# Patient Record
Sex: MALE
[De-identification: names, ages, dates, MRNs, and addresses within clinical notes are randomized per-mention and may not be internally consistent; named-entity substitution may affect disease eponyms.]

---

## 2017-04-17 ENCOUNTER — IMAGING SERVICES (OUTPATIENT)
Dept: OTHER | Age: 32
End: 2017-04-17

## 2017-04-17 ENCOUNTER — CHARTING TRANS (OUTPATIENT)
Dept: OCCUPATIONAL MEDICINE | Age: 32
End: 2017-04-17

## 2017-04-24 ENCOUNTER — CHARTING TRANS (OUTPATIENT)
Dept: OCCUPATIONAL MEDICINE | Age: 32
End: 2017-04-24

## 2018-11-09 ENCOUNTER — CHARTING TRANS (OUTPATIENT)
Dept: OTHER | Age: 33
End: 2018-11-09

## 2018-11-09 ASSESSMENT — PAIN SCALES - GENERAL: PAINLEVEL_OUTOF10: 9

## 2018-11-10 ENCOUNTER — CHARTING TRANS (OUTPATIENT)
Dept: OTHER | Age: 33
End: 2018-11-10

## 2018-11-10 ASSESSMENT — PAIN SCALES - GENERAL: PAINLEVEL_OUTOF10: 7

## 2018-11-26 ENCOUNTER — CHARTING TRANS (OUTPATIENT)
Dept: OTHER | Age: 33
End: 2018-11-26

## 2018-11-26 ENCOUNTER — IMAGING SERVICES (OUTPATIENT)
Dept: OTHER | Age: 33
End: 2018-11-26

## 2018-11-28 VITALS
HEIGHT: 71 IN | BODY MASS INDEX: 35 KG/M2 | SYSTOLIC BLOOD PRESSURE: 150 MMHG | HEART RATE: 49 BPM | DIASTOLIC BLOOD PRESSURE: 121 MMHG | RESPIRATION RATE: 16 BRPM | TEMPERATURE: 99 F | WEIGHT: 250 LBS

## 2018-11-28 VITALS — DIASTOLIC BLOOD PRESSURE: 84 MMHG | TEMPERATURE: 99.3 F | SYSTOLIC BLOOD PRESSURE: 135 MMHG | HEART RATE: 92 BPM

## 2018-12-03 ENCOUNTER — WORKER'S COMP (OUTPATIENT)
Dept: OCCUPATIONAL MEDICINE | Age: 33
End: 2018-12-03

## 2018-12-03 DIAGNOSIS — S60.051D CONTUSION OF RIGHT LITTLE FINGER WITHOUT DAMAGE TO NAIL, SUBSEQUENT ENCOUNTER: Primary | ICD-10-CM

## 2018-12-03 PROCEDURE — 99212 OFFICE O/P EST SF 10 MIN: CPT | Performed by: PHYSICIAN ASSISTANT

## 2018-12-03 ASSESSMENT — PAIN SCALES - GENERAL: PAINLEVEL: 5-6

## 2018-12-04 VITALS
HEART RATE: 125 BPM | DIASTOLIC BLOOD PRESSURE: 93 MMHG | TEMPERATURE: 100.3 F | RESPIRATION RATE: 16 BRPM | SYSTOLIC BLOOD PRESSURE: 147 MMHG

## 2019-03-05 VITALS
HEART RATE: 107 BPM | DIASTOLIC BLOOD PRESSURE: 86 MMHG | TEMPERATURE: 98.6 F | RESPIRATION RATE: 16 BRPM | SYSTOLIC BLOOD PRESSURE: 140 MMHG | OXYGEN SATURATION: 99 %

## 2019-03-05 VITALS
HEART RATE: 98 BPM | TEMPERATURE: 99.1 F | OXYGEN SATURATION: 99 % | SYSTOLIC BLOOD PRESSURE: 142 MMHG | RESPIRATION RATE: 16 BRPM | DIASTOLIC BLOOD PRESSURE: 90 MMHG

## 2019-07-10 ENCOUNTER — OFFICE VISIT (OUTPATIENT)
Dept: FAMILY MEDICINE CLINIC | Facility: CLINIC | Age: 34
End: 2019-07-10
Payer: COMMERCIAL

## 2019-07-10 VITALS
HEIGHT: 70.75 IN | DIASTOLIC BLOOD PRESSURE: 80 MMHG | WEIGHT: 262.38 LBS | RESPIRATION RATE: 16 BRPM | OXYGEN SATURATION: 98 % | HEART RATE: 80 BPM | TEMPERATURE: 98 F | SYSTOLIC BLOOD PRESSURE: 130 MMHG | BODY MASS INDEX: 36.73 KG/M2

## 2019-07-10 DIAGNOSIS — L73.9 FOLLICULITIS: ICD-10-CM

## 2019-07-10 DIAGNOSIS — L02.811 CUTANEOUS ABSCESS OF HEAD EXCLUDING FACE: Primary | ICD-10-CM

## 2019-07-10 PROCEDURE — 99203 OFFICE O/P NEW LOW 30 MIN: CPT | Performed by: NURSE PRACTITIONER

## 2019-07-10 RX ORDER — DOXYCYCLINE HYCLATE 100 MG
100 TABLET ORAL DAILY
Qty: 90 TABLET | Refills: 1 | Status: SHIPPED | OUTPATIENT
Start: 2019-07-10 | End: 2019-10-08

## 2019-07-10 RX ORDER — DOXYCYCLINE HYCLATE 100 MG
100 TABLET ORAL 2 TIMES DAILY
Qty: 20 TABLET | Refills: 0 | Status: SHIPPED | OUTPATIENT
Start: 2019-07-10 | End: 2019-07-20

## 2019-07-10 NOTE — PATIENT INSTRUCTIONS
Folliculitis  Folliculitis is an inflammation of a hair follicle. A hair follicle is the little pocket where a hair grows out of the skin. Bacteria normally live on the skin. But sometimes bacteria can get trapped in a follicle and cause infection.  This · Change sheets and blankets if they are soiled by pus. Wash all clothes, towels, sheets, and cloth diapers in soap and hot water. Do not share clothes, towels, or sheets with other family members. · Do not soak the sores in bath water.  This can spread in

## 2019-07-10 NOTE — PROGRESS NOTES
HPI:   Mass   This is a new (on the back of his neck) problem. Episode onset: 1 week ago. The problem has been gradually improving.  Pertinent negatives include no abdominal pain, chest pain, chills, congestion, coughing, fatigue, fever, headaches, nausea Cardiovascular: Normal rate, regular rhythm and normal heart sounds. Pulmonary/Chest: Effort normal and breath sounds normal. He has no wheezes. Skin:        2 cm by 4 cm erythematous, non-fluctuanting tender lesion.  Several smaller papules on posteri

## 2021-05-25 VITALS
TEMPERATURE: 98.6 F | HEART RATE: 67 BPM | SYSTOLIC BLOOD PRESSURE: 141 MMHG | RESPIRATION RATE: 16 BRPM | DIASTOLIC BLOOD PRESSURE: 85 MMHG

## 2022-01-02 ENCOUNTER — HOSPITAL ENCOUNTER (EMERGENCY)
Facility: HOSPITAL | Age: 37
Discharge: HOME | End: 2022-01-02
Payer: OTHER GOVERNMENT

## 2022-01-02 VITALS
DIASTOLIC BLOOD PRESSURE: 99 MMHG | WEIGHT: 250 LBS | HEART RATE: 105 BPM | RESPIRATION RATE: 18 BRPM | OXYGEN SATURATION: 98 % | TEMPERATURE: 98 F | HEIGHT: 71 IN | SYSTOLIC BLOOD PRESSURE: 153 MMHG | BODY MASS INDEX: 35 KG/M2

## 2022-01-02 DIAGNOSIS — Z20.822 CLOSE EXPOSURE TO COVID-19 VIRUS: Primary | ICD-10-CM

## 2022-01-02 PROCEDURE — 99283 EMERGENCY DEPT VISIT LOW MDM: CPT

## 2022-01-03 NOTE — ED PROVIDER NOTES
Patient Seen in: [facility] AND [facility] Emergency Department      History   Patient presents with:  Testing    Stated Complaint: Covid    Subjective:   37yo/m with no chronic medical problems reports to the ED with complaints of covid exposure.  He saw his rosalina sounds. Abdominal:      General: Bowel sounds are normal.      Palpations: Abdomen is soft. Musculoskeletal:         General: No tenderness or deformity. Normal range of motion. Cervical back: Normal range of motion and neck supple.    Skin:     Ge

## 2022-01-06 LAB — SARS-COV-2 RNA RESP QL NAA+PROBE: NOT DETECTED

## 2023-01-05 ENCOUNTER — APPOINTMENT (OUTPATIENT)
Dept: CT IMAGING | Facility: HOSPITAL | Age: 38
End: 2023-01-05
Attending: EMERGENCY MEDICINE
Payer: COMMERCIAL

## 2023-01-05 ENCOUNTER — HOSPITAL ENCOUNTER (EMERGENCY)
Facility: HOSPITAL | Age: 38
Discharge: HOME | End: 2023-01-05
Attending: EMERGENCY MEDICINE
Payer: COMMERCIAL

## 2023-01-05 VITALS
OXYGEN SATURATION: 98 % | BODY MASS INDEX: 35.7 KG/M2 | SYSTOLIC BLOOD PRESSURE: 147 MMHG | RESPIRATION RATE: 22 BRPM | WEIGHT: 255 LBS | HEIGHT: 71 IN | DIASTOLIC BLOOD PRESSURE: 102 MMHG | TEMPERATURE: 98 F | HEART RATE: 91 BPM

## 2023-01-05 DIAGNOSIS — R51.9 NONINTRACTABLE EPISODIC HEADACHE, UNSPECIFIED HEADACHE TYPE: Primary | ICD-10-CM

## 2023-01-05 DIAGNOSIS — I10 UNCONTROLLED HYPERTENSION: ICD-10-CM

## 2023-01-05 PROCEDURE — 70450 CT HEAD/BRAIN W/O DYE: CPT | Performed by: EMERGENCY MEDICINE

## 2023-01-05 PROCEDURE — 99284 EMERGENCY DEPT VISIT MOD MDM: CPT

## 2023-01-05 RX ORDER — IBUPROFEN 600 MG/1
600 TABLET ORAL ONCE
Status: COMPLETED | OUTPATIENT
Start: 2023-01-05 | End: 2023-01-05

## 2023-01-05 RX ORDER — DIPHENHYDRAMINE HCL 25 MG
25 CAPSULE ORAL ONCE
Status: DISCONTINUED | OUTPATIENT
Start: 2023-01-05 | End: 2023-01-05

## 2023-01-05 RX ORDER — BUTALBITAL, ACETAMINOPHEN AND CAFFEINE 50; 325; 40 MG/1; MG/1; MG/1
1-2 TABLET ORAL EVERY 4 HOURS PRN
Qty: 20 TABLET | Refills: 0 | Status: SHIPPED | OUTPATIENT
Start: 2023-01-05

## 2025-01-19 ENCOUNTER — APPOINTMENT (OUTPATIENT)
Dept: GENERAL RADIOLOGY | Facility: HOSPITAL | Age: 40
End: 2025-01-19
Attending: EMERGENCY MEDICINE
Payer: COMMERCIAL

## 2025-01-19 ENCOUNTER — HOSPITAL ENCOUNTER (EMERGENCY)
Facility: HOSPITAL | Age: 40
Discharge: HOME | End: 2025-01-19
Attending: EMERGENCY MEDICINE
Payer: COMMERCIAL

## 2025-01-19 VITALS
SYSTOLIC BLOOD PRESSURE: 156 MMHG | OXYGEN SATURATION: 94 % | WEIGHT: 240 LBS | TEMPERATURE: 98 F | BODY MASS INDEX: 33.6 KG/M2 | HEART RATE: 119 BPM | HEIGHT: 71 IN | RESPIRATION RATE: 24 BRPM | DIASTOLIC BLOOD PRESSURE: 113 MMHG

## 2025-01-19 DIAGNOSIS — R07.89 CHEST WALL PAIN: Primary | ICD-10-CM

## 2025-01-19 LAB
ANION GAP SERPL CALC-SCNC: 9 MMOL/L (ref 0–18)
BASOPHILS # BLD AUTO: 0.03 X10(3) UL (ref 0–0.2)
BASOPHILS NFR BLD AUTO: 0.2 %
BUN BLD-MCNC: 17 MG/DL (ref 9–23)
BUN/CREAT SERPL: 22.1 (ref 10–20)
CALCIUM BLD-MCNC: 9.6 MG/DL (ref 8.7–10.4)
CHLORIDE SERPL-SCNC: 107 MMOL/L (ref 98–112)
CO2 SERPL-SCNC: 28 MMOL/L (ref 21–32)
CREAT BLD-MCNC: 0.77 MG/DL
D DIMER PPP FEU-MCNC: <0.27 UG/ML FEU (ref ?–0.5)
DEPRECATED RDW RBC AUTO: 38.9 FL (ref 35.1–46.3)
EGFRCR SERPLBLD CKD-EPI 2021: 115 ML/MIN/1.73M2 (ref 60–?)
EOSINOPHIL # BLD AUTO: 0.24 X10(3) UL (ref 0–0.7)
EOSINOPHIL NFR BLD AUTO: 1.6 %
ERYTHROCYTE [DISTWIDTH] IN BLOOD BY AUTOMATED COUNT: 11.9 % (ref 11–15)
GLUCOSE BLD-MCNC: 138 MG/DL (ref 70–99)
HCT VFR BLD AUTO: 48.4 %
HGB BLD-MCNC: 16.1 G/DL
IMM GRANULOCYTES # BLD AUTO: 0.07 X10(3) UL (ref 0–1)
IMM GRANULOCYTES NFR BLD: 0.5 %
LYMPHOCYTES # BLD AUTO: 1.11 X10(3) UL (ref 1–4)
LYMPHOCYTES NFR BLD AUTO: 7.3 %
MCH RBC QN AUTO: 29.4 PG (ref 26–34)
MCHC RBC AUTO-ENTMCNC: 33.3 G/DL (ref 31–37)
MCV RBC AUTO: 88.5 FL
MONOCYTES # BLD AUTO: 0.81 X10(3) UL (ref 0.1–1)
MONOCYTES NFR BLD AUTO: 5.3 %
NEUTROPHILS # BLD AUTO: 12.92 X10 (3) UL (ref 1.5–7.7)
NEUTROPHILS # BLD AUTO: 12.92 X10(3) UL (ref 1.5–7.7)
NEUTROPHILS NFR BLD AUTO: 85.1 %
OSMOLALITY SERPL CALC.SUM OF ELEC: 302 MOSM/KG (ref 275–295)
PLATELET # BLD AUTO: 318 10(3)UL (ref 150–450)
POTASSIUM SERPL-SCNC: 3.7 MMOL/L (ref 3.5–5.1)
RBC # BLD AUTO: 5.47 X10(6)UL
SODIUM SERPL-SCNC: 144 MMOL/L (ref 136–145)
WBC # BLD AUTO: 15.2 X10(3) UL (ref 4–11)

## 2025-01-19 PROCEDURE — 71045 X-RAY EXAM CHEST 1 VIEW: CPT | Performed by: EMERGENCY MEDICINE

## 2025-01-19 PROCEDURE — 96374 THER/PROPH/DIAG INJ IV PUSH: CPT

## 2025-01-19 PROCEDURE — 99285 EMERGENCY DEPT VISIT HI MDM: CPT

## 2025-01-19 PROCEDURE — 93005 ELECTROCARDIOGRAM TRACING: CPT

## 2025-01-19 PROCEDURE — 93010 ELECTROCARDIOGRAM REPORT: CPT

## 2025-01-19 PROCEDURE — 85379 FIBRIN DEGRADATION QUANT: CPT | Performed by: EMERGENCY MEDICINE

## 2025-01-19 PROCEDURE — 80048 BASIC METABOLIC PNL TOTAL CA: CPT | Performed by: EMERGENCY MEDICINE

## 2025-01-19 PROCEDURE — 85025 COMPLETE CBC W/AUTO DIFF WBC: CPT | Performed by: EMERGENCY MEDICINE

## 2025-01-19 RX ORDER — KETOROLAC TROMETHAMINE 15 MG/ML
15 INJECTION, SOLUTION INTRAMUSCULAR; INTRAVENOUS ONCE
Status: COMPLETED | OUTPATIENT
Start: 2025-01-19 | End: 2025-01-19

## 2025-01-19 NOTE — ED PROVIDER NOTES
Patient Seen in: [facility] Emergency Department      History     Chief Complaint   Patient presents with    Pain     Rib pain     Stated Complaint: Left sided pain    Subjective:   HPI      39-year-old male without significant past medical history presents with complaints of left-sided rib pain.  The patient reports aching pain to his left anterior rib cage beginning this morning.  The patient states the pain is exacerbated with movement and deep in elation.  He denies any cough.  Mild associated dyspnea.  No fevers.  He denies any known injury.  He has not taken any medication for the pain.  He denies leg pain or swelling.    Objective:     No pertinent past medical history.            No pertinent past surgical history.              No pertinent social history.                Physical Exam     ED Triage Vitals   BP 01/19/25 1653 (!) 156/104   Pulse 01/19/25 1653 115   Resp 01/19/25 1653 22   Temp 01/19/25 1653 97.5 °F (36.4 °C)   Temp src --    SpO2 01/19/25 1653 97 %   O2 Device 01/19/25 1730 None (Room air)       Current Vitals:   Vital Signs  BP: (!) 156/104  Pulse: 115  Resp: 22  Temp: 97.5 °F (36.4 °C)    Oxygen Therapy  SpO2: 97 %  O2 Device: None (Room air)        Physical Exam    General Appearance: alert, no distress  Eyes: pupils equal and round no pallor or injection  ENT, Mouth: mucous membranes moist  Respiratory: there are no retractions, lungs are clear to auscultation.  Tenderness to palpation to the left anterior rib cage at ribs 6 through 8 on the lateral clavicular line to the anterior axillary line.  No overlying ecchymosis or skin changes noted.  Cardiovascular: regular rate and rhythm  Gastrointestinal:  abdomen is soft and non tender, no masses, bowel sounds normal  Neurological: Speech normal.  Moving extremities x 4.  Skin: warm and dry, no rashes.  Musculoskeletal: neck is supple non tender        Extremities are symmetrical, full range of motion.  No leg edema or tenderness  noted.  Psychiatric: patient is oriented X 3, there is no agitation    DIFFERENTIAL DIAGNOSIS: After history and physical exam differential diagnosis was considered for costochondritis, occult injury, pulmonary embolism, pulmonary infectious process, or other        ED Course     Labs Reviewed   BASIC METABOLIC PANEL (8) - Abnormal; Notable for the following components:       Result Value    Glucose 138 (*)     BUN/CREA Ratio 22.1 (*)     Calculated Osmolality 302 (*)     All other components within normal limits   CBC WITH DIFFERENTIAL WITH PLATELET - Abnormal; Notable for the following components:    WBC 15.2 (*)     Neutrophil Absolute Prelim 12.92 (*)     Neutrophil Absolute 12.92 (*)     All other components within normal limits   D-DIMER - Normal     EKG    Rate, intervals and axes as noted on EKG Report.  Rate: 106  Rhythm: Sinus Rhythm  Axis: Normal  Reading: Sinus tachycardia, otherwise normal EKG                     MDM      Chest x-ray was reviewed independently by me.  No pneumonia or pneumothorax noted.  Lab and EKG results noted.  No cause of the patient's pain found at this time.  He does report some improvement in pain post Toradol.  Resting comfortably at present.  Will discharge home with recommendations for ibuprofen for pain and primary care follow-up.  He is advised to return if worsening symptoms develop.        Medical Decision Making      Disposition and Plan     Clinical Impression:  1. Chest wall pain         Disposition:  Discharge  1/19/2025  6:04 pm    Follow-up:  own physician    Follow up            Medications Prescribed:  Current Discharge Medication List              Supplementary Documentation:

## 2025-01-20 LAB
ATRIAL RATE: 106 BPM
P AXIS: 48 DEGREES
P-R INTERVAL: 132 MS
Q-T INTERVAL: 348 MS
QRS DURATION: 90 MS
QTC CALCULATION (BEZET): 462 MS
R AXIS: 6 DEGREES
T AXIS: 57 DEGREES
VENTRICULAR RATE: 106 BPM

## 2025-01-20 NOTE — DISCHARGE INSTRUCTIONS
Take ibuprofen (up to 600 mg every 6 hours) as needed for pain.  Follow-up with your primary physician for reevaluation later this week.  Return to the emergency department if increased pain, increased difficulty breathing, or other new symptoms develop.

## 2025-05-19 ENCOUNTER — APPOINTMENT (OUTPATIENT)
Dept: OCCUPATIONAL MEDICINE | Age: 40
End: 2025-05-19

## 2025-05-19 ENCOUNTER — IMAGING SERVICES (OUTPATIENT)
Dept: GENERAL RADIOLOGY | Age: 40
End: 2025-05-19

## 2025-05-19 VITALS
SYSTOLIC BLOOD PRESSURE: 144 MMHG | BODY MASS INDEX: 35 KG/M2 | HEIGHT: 71 IN | DIASTOLIC BLOOD PRESSURE: 89 MMHG | WEIGHT: 250 LBS | HEART RATE: 88 BPM

## 2025-05-19 DIAGNOSIS — Z02.89 VISIT FOR OCCUPATIONAL HEALTH EXAMINATION: Primary | ICD-10-CM

## 2025-05-19 PROCEDURE — 86480 TB TEST CELL IMMUN MEASURE: CPT | Performed by: INTERNAL MEDICINE

## 2025-05-19 PROCEDURE — 94010 BREATHING CAPACITY TEST: CPT

## 2025-05-19 PROCEDURE — OH021 PRE PLACEMENT PHYSICAL EXAM

## 2025-05-19 PROCEDURE — 36415 COLL VENOUS BLD VENIPUNCTURE: CPT

## 2025-05-20 ENCOUNTER — RESULTS FOLLOW-UP (OUTPATIENT)
Dept: OCCUPATIONAL MEDICINE | Age: 40
End: 2025-05-20

## 2025-05-21 LAB
GAMMA INTERFERON BACKGROUND BLD IA-ACNC: 0.06 IU/ML
M TB IFN-G BLD-IMP: NEGATIVE
M TB IFN-G CD4+ BCKGRND COR BLD-ACNC: 0 IU/ML
M TB IFN-G CD4+CD8+ BCKGRND COR BLD-ACNC: 0 IU/ML
MITOGEN IGNF BCKGRD COR BLD-ACNC: 9.89 IU/ML

## 2025-05-22 ENCOUNTER — OFFICE VISIT (OUTPATIENT)
Dept: FAMILY MEDICINE CLINIC | Facility: CLINIC | Age: 40
End: 2025-05-22
Payer: COMMERCIAL

## 2025-05-22 VITALS
HEART RATE: 71 BPM | HEIGHT: 71 IN | BODY MASS INDEX: 38.64 KG/M2 | TEMPERATURE: 97 F | DIASTOLIC BLOOD PRESSURE: 80 MMHG | RESPIRATION RATE: 16 BRPM | WEIGHT: 276 LBS | OXYGEN SATURATION: 98 % | SYSTOLIC BLOOD PRESSURE: 130 MMHG

## 2025-05-22 DIAGNOSIS — Z00.00 ENCOUNTER FOR MEDICAL EXAMINATION TO ESTABLISH CARE: Primary | ICD-10-CM

## 2025-05-22 DIAGNOSIS — Z12.5 PROSTATE CANCER SCREENING: ICD-10-CM

## 2025-05-22 DIAGNOSIS — R73.09 ELEVATED GLUCOSE: ICD-10-CM

## 2025-05-22 DIAGNOSIS — Z13.220 LIPID SCREENING: ICD-10-CM

## 2025-05-22 DIAGNOSIS — R03.0 ELEVATED BP WITHOUT DIAGNOSIS OF HYPERTENSION: ICD-10-CM

## 2025-05-22 LAB
CHOLEST SERPL-MCNC: 156 MG/DL (ref ?–200)
COMPLEXED PSA SERPL-MCNC: 0.77 NG/ML (ref ?–4)
EST. AVERAGE GLUCOSE BLD GHB EST-MCNC: 117 MG/DL (ref 68–126)
FASTING PATIENT LIPID ANSWER: YES
HBA1C MFR BLD: 5.7 % (ref ?–5.7)
HDLC SERPL-MCNC: 38 MG/DL (ref 40–59)
LDLC SERPL CALC-MCNC: 89 MG/DL (ref ?–100)
NONHDLC SERPL-MCNC: 118 MG/DL (ref ?–130)
TRIGL SERPL-MCNC: 165 MG/DL (ref 30–149)
VLDLC SERPL CALC-MCNC: 27 MG/DL (ref 0–30)

## 2025-05-22 PROCEDURE — 80061 LIPID PANEL: CPT | Performed by: FAMILY MEDICINE

## 2025-05-22 PROCEDURE — 83036 HEMOGLOBIN GLYCOSYLATED A1C: CPT | Performed by: FAMILY MEDICINE

## 2025-05-22 PROCEDURE — 99203 OFFICE O/P NEW LOW 30 MIN: CPT | Performed by: FAMILY MEDICINE

## 2025-05-22 NOTE — PROGRESS NOTES
Siddharth Bradley is a 39 year old male.   Chief Complaint   Patient presents with    Blood Pressure     Bp follow up. PX at work and he said it was high when bp was taken on Monday.      HPI:      The patient presents to the office for concerns of elevated bp. 39-year-old male comes in to establish care and revisit his blood pressure.  Patient states that on the 19th he went for an occupational health wellness visit which he was found to have slightly elevated blood pressure 144/89.  He was recommended at that point that he see me regarding his blood pressure to see if he needs to have any interventions done.  Patient denies any chest pain shortness of breath or any palpitations.  Patient does admit to not have seen doctor in a long time for preventative care.    Past Medical History[1]  Past Surgical History[2]  Family History[3]  Social History:  Short Social Hx on File[4]  Allergies:  Allergies[5]   Current Meds:  Current Medications[6]     REVIEW OF SYSTEMS:   GENERAL HEALTH: no fever, no appetite change, no weight change  HEENT: no vision changes, no ear symptoms, no hearing changes, no eye symptoms, no nasal symptoms  RESPIRATORY: no cough, no wheezing, no dyspnea, no orthopnea   CARDIOVASCULAR: no chest pain, no chest pressure, no palpitations, no claudication, no edema, no cold extremities, no dyspnea on exertion, no diaphoresis   GI: no nausea, no vomiting, no juandice, no stool changes, no abdominal pain  : no incontinence, no urine changes, no urinary symptoms, no bladder pain  SKIN: no rash, no suspicious mole, no skin lesions,   NEURO: no motor function change, no sensory change, no cognitive changes, no memory changes, no seizures, no headache, no depression and no anxiety  MS: no musculoskeletal symptoms, no joint pain, no joint stiffness, no joint swelling  Lymphatic:  no cervical lymph node enlargement, no inguinal lymph node enlargement  Endocrine: no cold intolerance, no heat intolerance , no  polydipsia ,no polyuria    EXAM:   /80   Pulse 71   Temp 97 °F (36.1 °C)   Resp 16   Ht 5' 11\" (1.803 m)   Wt 276 lb (125.2 kg)   SpO2 98%   BMI 38.49 kg/m²     GENERAL: healthy appearing, well developed, well nourished,in no apparent distress, A&Ox3  Head:  atraumatic, normocephalic  Eyes: PERRLA,EOMI, conjunctiva appeared normal, eyelids appeared normal  ENT: Oropharynx unremarkable   Neck: supple, no thyroid masses, no cervical adenopathy   Respiratory/Chest: CTAB, no wheezing,no rales  CARDIO: RRR , S1,S2 normal, S3 abscent,no murmur  Vascular: no edema  GI/Abdomen: soft, nontender, no mass, BS present  SKIN: warm, no rash  EXTREMITIES.MS: no cyanosis, clubbing or edema, Full ROM of all extremities, no joint swelling or tenderness  Neuro/Psych: MS 5/5 throughout, sensation intact, CN 2-12 grossly intact, DTR were 2+ and symmetric.     ASSESSMENT/ PLAN:     1. Encounter for medical examination to establish care  Discussed office procedures and protocols.  Was able to review labs from his visit and the visit itself from May 19.    2. Elevated BP without diagnosis of hypertension  Blood pressure was barely elevated on that visit he was checked twice in our office by my nurse and myself and it was within normal range.  Reassured patient that he needs to have a pattern of constantly elevated blood pressure (3 or more) in order to justify any medication being started.  Advised patient that every time he goes somewhere medical to have them checked her blood pressure if it is possible to keep track of that.  Patient does have more elevated BPs within parameters described let us know.    3. Elevated glucose  More concerning is the fact that he had a glucose of 138 when he had blood work done on that day.  Will check an A1c to make sure he is not diabetic  - Hemoglobin A1C [E]; Future  - Hemoglobin A1C [E]    4. Prostate cancer screening  [facility] for prostate health excess 39 years old  checking PSA  - PSA, Total (Screening) [E]; Future  - PSA, Total (Screening) [E]    5. Lipid screening  We also need to check his cholesterol as he has not been checked in a while.  - Lipid Panel [E]; Future  - Lipid Panel [E]      No follow-ups on file.    The patient is to return to office in as needed  The patient is to return to office for persistent or worsening signs and symptoms.   The proper use of medication and possible side effects discussed with patient.  An AVS was given to patient.  The patient verbalized understanding, agrees to treatment regimen and all questions were answered.          [1] History reviewed. No pertinent past medical history.  [2] History reviewed. No pertinent surgical history.  [3]   Family History  Problem Relation Age of Onset    Diabetes Maternal Grandmother    [4]   Social History  Socioeconomic History    Marital status:    Tobacco Use    Smoking status: Former     Types: Cigarettes    Smokeless tobacco: Never    Tobacco comments:     Smokes every now and then. Not regularly.    Vaping Use    Vaping status: Never Used   Substance and Sexual Activity    Alcohol use: Yes     Comment:  occasionally    Drug use: Not Currently   Other Topics Concern    Caffeine Concern Yes     Comment: coffee, soda, 3 cups daily     Social Drivers of Health     Food Insecurity: Not on File (2024)    Received from [facility]    Food Insecurity     Food: 0   Transportation Needs: Not on File (10/5/2022)    Received from [facility]    Transportation Needs     Transportation: 0   Stress: Not on File (10/5/2022)    Received from [facility]    Stress     Stress: 0   Housing Stability: Not on File (10/5/2022)    Received from [facility]    Housing Stability     Housin   [5] No Known Allergies  [6]   Current Outpatient Medications   Medication Sig Dispense Refill    butalbital-acetaminophen-caffeine -40 MG Oral Tab Take 1-2 tablets by mouth every 4 (four) hours as needed for Headaches. (Patient not  taking: Reported on 5/22/2025) 20 tablet 0

## 2025-06-10 ENCOUNTER — OCC HEALTH (OUTPATIENT)
Dept: OCCUPATIONAL MEDICINE | Age: 40
End: 2025-06-10

## 2025-06-10 ENCOUNTER — RESULTS FOLLOW-UP (OUTPATIENT)
Dept: OCCUPATIONAL MEDICINE | Age: 40
End: 2025-06-10

## 2025-06-10 DIAGNOSIS — Z02.89 VISIT FOR OCCUPATIONAL HEALTH EXAMINATION: Primary | ICD-10-CM

## 2025-06-10 PROCEDURE — 94010 BREATHING CAPACITY TEST: CPT

## 2025-06-10 PROCEDURE — 94010 BREATHING CAPACITY TEST: CPT | Performed by: PHYSICIAN ASSISTANT
